# Patient Record
(demographics unavailable — no encounter records)

---

## 2024-12-16 NOTE — HISTORY OF PRESENT ILLNESS
[FreeTextEntry1] : 26-year-old male no prior cardiac history who presents for consultation.  He presented to an outside provider due to worsening dyspnea after exertion.  He has been power lifting for the past 3 years and over the past 2 years he has noticed difficulty catching his breath after lifting. He did gain ~20 pounds since powerlifting. Over the past few months this has become more frequent and severe.  He reports that is now taking longer for him to recover ~10 minutes.  He has also reported occasional chest pain that is described as dull but is not associated with dyspnea.  He denies any dizziness/lightheadedness, palpitations, lower extremity swelling.  These episodes are random and are not associated with exertion.  These episodes last for about 10 minutes.  He was sent for an echocardiogram and a cardiac MRI that suggested a low normal LV systolic function ~47% and mildly dilated LV. He was started on lisinopril 5 mg daily and reported not feeling well when taking it.  He also reported lack of energy.  He reports that his BP is ~120/60 and after taking lisinopril BP would be ~110/50.  He is not taking lisinopril anymore and feels better.  Since his tests he has stopped power lifting and has reported feeling better.  He reports no more chest pain or dyspnea albeit he has not been as active.  He does still do light cardio and reports no limitations.  He was also recently diagnosed with sleep apnea in April.  He uses a CPAP regularly and has noticed an improvement in energy.  He has no family history of heart disease.  He is a non-smoker.  He does not use any stimulants or performance-enhancing drugs.  He takes a multivitamin and omega-3.

## 2024-12-16 NOTE — ASSESSMENT
[FreeTextEntry1] : #NICM This is a 26-year-old male with no prior cardiac history or significant risk factors who presents with signs of cardiomyopathy on recent echocardiogram and cardiac MRI.  Based on the reports I am presuming he has a nonischemic ACC heart failure stage B-C with NYHA class II symptoms.  At this time I am not entirely convinced about this diagnosis given that I do not have images.  I will get a second opinion on the cardiac MRI.  I will also repeat an echocardiogram so I can take a look at the images myself.  I discussed with the patient that the etiology of his potential cardiomyopathy is most likely nonischemic.  He does not use any illicit drugs or performance-enhancing drugs.  It is possible that his sleep apnea is contributing to his cardiomyopathy.  If he truly does have a nonischemic cardiomyopathy then I think the next best step would be to send for genetic testing.  On exam he is warm, well-perfused, and euvolemic.  He was started on a low-dose of lisinopril but not surprisingly did not tolerate.  I discussed with the patient that given that he is young and overall healthy it can be tricky to start any GDMT as most patients do not tolerate as well.  At this time I will hold off on any treatment before I obtain more information.  I will send for basic lab work.  At this time I have discussed with the patient that it is probably most beneficial to back off and/or stop power lifting at this time.  I did reassure the patient though that it is fine to continue exercising.  Plan -Repeat TTE -Labwork -2nd opinion on cardiac MRI read -RTC after TTE and labwork  Tristian Hatch MD Interventional Cardiology/Advanced Heart Failure Transplant [facility]

## 2024-12-16 NOTE — CARDIOLOGY SUMMARY
[de-identified] : NSR HR 65 [de-identified] : TTE 9/2024; LVEF 62%, LVEDD 6.1cm [de-identified] : cMRI 11/2024- LVEF 47%, LVEDD 5.8cm